# Patient Record
Sex: FEMALE | Race: WHITE | NOT HISPANIC OR LATINO | URBAN - NONMETROPOLITAN AREA
[De-identification: names, ages, dates, MRNs, and addresses within clinical notes are randomized per-mention and may not be internally consistent; named-entity substitution may affect disease eponyms.]

---

## 2020-11-12 ENCOUNTER — OFFICE VISIT (OUTPATIENT)
Dept: URBAN - NONMETROPOLITAN AREA CLINIC 2 | Facility: CLINIC | Age: 80
End: 2020-11-12

## 2020-11-12 ENCOUNTER — OFFICE VISIT (OUTPATIENT)
Dept: URBAN - NONMETROPOLITAN AREA CLINIC 2 | Facility: CLINIC | Age: 80
End: 2020-11-12
Payer: MEDICARE

## 2020-11-12 DIAGNOSIS — R05 COUGH: ICD-10-CM

## 2020-11-12 DIAGNOSIS — K63.5 COLON POLYPS: ICD-10-CM

## 2020-11-12 DIAGNOSIS — K44.9 HIATAL HERNIA: ICD-10-CM

## 2020-11-12 PROCEDURE — 99212 OFFICE O/P EST SF 10 MIN: CPT | Performed by: NURSE PRACTITIONER

## 2020-11-12 PROCEDURE — G8482 FLU IMMUNIZE ORDER/ADMIN: HCPCS | Performed by: NURSE PRACTITIONER

## 2020-11-12 PROCEDURE — G8427 DOCREV CUR MEDS BY ELIG CLIN: HCPCS | Performed by: NURSE PRACTITIONER

## 2020-11-12 PROCEDURE — G8420 CALC BMI NORM PARAMETERS: HCPCS | Performed by: NURSE PRACTITIONER

## 2020-11-12 PROCEDURE — G9903 PT SCRN TBCO ID AS NON USER: HCPCS | Performed by: NURSE PRACTITIONER

## 2020-11-12 RX ORDER — FAMOTIDINE 40 MG/1
TAKE 1 TABLET (40 MG) BY ORAL ROUTE ONCE DAILY AT BEDTIME FOR 90 DAYS TABLET ORAL 1
Qty: 90 | Refills: 3 | Status: ACTIVE | COMMUNITY
Start: 2019-12-06 | End: 2020-11-30

## 2020-11-12 NOTE — HPI-OTHER HISTORIES
EGD over the summer 2018 with a small 2 cm hiatal hernia and esophagitis.  She did have a nonobstructing Schatzki ring as well.  Colonoscopy with one TA polyp.

## 2020-11-12 NOTE — HPI-TODAY'S VISIT:
Ms Gaxiola returns for f/u. She is having no GI complaints. She has had no further coughing issues or heartburn. she remains on pepcid daily. She did have a fall on the way to [place] this summer. She ended up with a bruises on her breast, stitches to her lip, and $6K ED bill. SB

## 2021-01-05 ENCOUNTER — ERX REFILL RESPONSE (OUTPATIENT)
Age: 81
End: 2021-01-05

## 2021-01-05 RX ORDER — FAMOTIDINE 40 MG/1
TAKE 1 TABLET BY MOUTH EVERY DAY AT BEDTIME TABLET, FILM COATED ORAL
Qty: 90 | Refills: 0

## 2021-02-05 ENCOUNTER — ERX REFILL RESPONSE (OUTPATIENT)
Age: 81
End: 2021-02-05

## 2021-02-05 RX ORDER — FAMOTIDINE 40 MG/1
TAKE 1 TABLET BY MOUTH EVERY DAY AT BEDTIME TABLET, FILM COATED ORAL
Qty: 90 | Refills: 0

## 2021-04-14 ENCOUNTER — OFFICE VISIT (OUTPATIENT)
Dept: URBAN - NONMETROPOLITAN AREA CLINIC 2 | Facility: CLINIC | Age: 81
End: 2021-04-14
Payer: MEDICARE

## 2021-04-14 DIAGNOSIS — K44.9 HIATAL HERNIA: ICD-10-CM

## 2021-04-14 DIAGNOSIS — K59.04 CHRONIC IDIOPATHIC CONSTIPATION: ICD-10-CM

## 2021-04-14 DIAGNOSIS — K63.5 COLON POLYPS: ICD-10-CM

## 2021-04-14 DIAGNOSIS — R05 COUGH: ICD-10-CM

## 2021-04-14 DIAGNOSIS — R10.11 RUQ ABDOMINAL PAIN: ICD-10-CM

## 2021-04-14 PROBLEM — 82934008: Status: ACTIVE | Noted: 2021-04-14

## 2021-04-14 PROCEDURE — 99213 OFFICE O/P EST LOW 20 MIN: CPT | Performed by: NURSE PRACTITIONER

## 2021-04-14 RX ORDER — FAMOTIDINE 40 MG/1
TAKE 1 TABLET BY MOUTH EVERY DAY AT BEDTIME TABLET, FILM COATED ORAL
Qty: 90 | Refills: 0 | Status: ACTIVE | COMMUNITY

## 2021-04-14 RX ORDER — DOCUSATE SODIUM 100 MG/1
2 CAPSULES CAPSULE ORAL ONCE A DAY
Qty: 60 CAPSULE | Refills: 6 | OUTPATIENT
Start: 2021-04-14 | End: 2021-11-09

## 2021-04-14 RX ORDER — LORATADINE 10 MG
1 PACKET MIXED WITH 8 OUNCES OF FLUID TABLET,DISINTEGRATING ORAL ONCE A DAY
Qty: 30 | OUTPATIENT
Start: 2021-04-14 | End: 2021-05-14

## 2021-04-14 NOTE — HPI-TODAY'S VISIT:
Ms Gaxiola returns for f/u of GERD. Heartrbun is well controlled on pepcid nightly. her main CC today is constipation. she has started having issues with only moving her bowels 2-3x weekly and used to go daily. occasionally, she will get a ruq pain that is relieved with BM. No additional complaints.  SB

## 2021-05-06 ENCOUNTER — ERX REFILL RESPONSE (OUTPATIENT)
Dept: URBAN - NONMETROPOLITAN AREA CLINIC 2 | Facility: CLINIC | Age: 81
End: 2021-05-06

## 2021-05-06 RX ORDER — PANTOPRAZOLE SODIUM 20 MG/1
TAKE 1 TABLET BY MOUTH EVERY DAY TABLET, DELAYED RELEASE ORAL
Qty: 90 | Refills: 3

## 2021-05-12 ENCOUNTER — ERX REFILL RESPONSE (OUTPATIENT)
Dept: URBAN - NONMETROPOLITAN AREA CLINIC 2 | Facility: CLINIC | Age: 81
End: 2021-05-12

## 2021-05-12 RX ORDER — FAMOTIDINE 40 MG/1
TAKE 1 TABLET BY MOUTH EVERY DAY AT BEDTIME TABLET, FILM COATED ORAL
Qty: 90 | Refills: 0

## 2022-05-03 ENCOUNTER — ERX REFILL RESPONSE (OUTPATIENT)
Dept: URBAN - NONMETROPOLITAN AREA CLINIC 2 | Facility: CLINIC | Age: 82
End: 2022-05-03

## 2022-05-03 RX ORDER — PANTOPRAZOLE SODIUM 20 MG/1
TAKE 1 TABLET BY MOUTH EVERY DAY TABLET, DELAYED RELEASE ORAL
Qty: 90 TABLET | Refills: 4 | OUTPATIENT

## 2022-05-03 RX ORDER — PANTOPRAZOLE SODIUM 20 MG/1
TAKE 1 TABLET BY MOUTH EVERY DAY TABLET, DELAYED RELEASE ORAL
Qty: 90 | Refills: 3 | OUTPATIENT

## 2023-04-26 ENCOUNTER — ERX REFILL RESPONSE (OUTPATIENT)
Dept: URBAN - NONMETROPOLITAN AREA CLINIC 2 | Facility: CLINIC | Age: 83
End: 2023-04-26

## 2023-04-26 RX ORDER — PANTOPRAZOLE SODIUM 20 MG/1
TAKE 1 TABLET BY MOUTH EVERY DAY TABLET, DELAYED RELEASE ORAL
Qty: 90 TABLET | Refills: 4 | OUTPATIENT

## 2024-01-12 ENCOUNTER — OFFICE VISIT (OUTPATIENT)
Dept: URBAN - NONMETROPOLITAN AREA CLINIC 2 | Facility: CLINIC | Age: 84
End: 2024-01-12
Payer: MEDICARE

## 2024-01-12 ENCOUNTER — LAB OUTSIDE AN ENCOUNTER (OUTPATIENT)
Dept: URBAN - NONMETROPOLITAN AREA CLINIC 2 | Facility: CLINIC | Age: 84
End: 2024-01-12

## 2024-01-12 VITALS
TEMPERATURE: 98 F | DIASTOLIC BLOOD PRESSURE: 58 MMHG | SYSTOLIC BLOOD PRESSURE: 99 MMHG | BODY MASS INDEX: 26.41 KG/M2 | WEIGHT: 131 LBS | HEART RATE: 73 BPM | HEIGHT: 59 IN

## 2024-01-12 DIAGNOSIS — K59.09 OTHER CONSTIPATION: ICD-10-CM

## 2024-01-12 DIAGNOSIS — K30 INDIGESTION: ICD-10-CM

## 2024-01-12 DIAGNOSIS — K92.1 MELENA: ICD-10-CM

## 2024-01-12 PROBLEM — 162031009: Status: ACTIVE | Noted: 2024-01-12

## 2024-01-12 PROCEDURE — 99244 OFF/OP CNSLTJ NEW/EST MOD 40: CPT | Performed by: NURSE PRACTITIONER

## 2024-01-12 PROCEDURE — 99214 OFFICE O/P EST MOD 30 MIN: CPT | Performed by: NURSE PRACTITIONER

## 2024-01-12 RX ORDER — PANTOPRAZOLE SODIUM 20 MG/1
TAKE 1 TABLET BY MOUTH EVERY DAY TABLET, DELAYED RELEASE ORAL
Qty: 90 TABLET | Refills: 4 | Status: ACTIVE | COMMUNITY

## 2024-01-12 RX ORDER — OMEPRAZOLE 40 MG/1
1 CAPSULE 30 MINUTES BEFORE MORNING MEAL AND EVENING MEAL CAPSULE, DELAYED RELEASE ORAL TWICE DAILY
Qty: 60 | Refills: 3 | OUTPATIENT
Start: 2024-01-12

## 2024-01-12 RX ORDER — FAMOTIDINE 40 MG/1
TAKE 1 TABLET BY MOUTH EVERY DAY AT BEDTIME TABLET, FILM COATED ORAL
Qty: 90 | Refills: 0 | Status: ACTIVE | COMMUNITY

## 2024-01-12 NOTE — HPI-OTHER HISTORIES
4/14/2021: Ms Gaxiola returns for f/u of GERD. Heartrbun is well controlled on pepcid nightly. her main CC today is constipation. she has started having issues with only moving her bowels 2-3x weekly and used to go daily. occasionally, she will get a ruq pain that is relieved with BM. No additional complaints. SB  EGD over the summer 2018 with a small 2 cm hiatal hernia and esophagitis.  She did have a nonobstructing Schatzki ring as well.  Colonoscopy with one TA polyp.

## 2024-01-12 NOTE — HPI-TODAY'S VISIT:
Ms. Donna Gaxiola is an 83-year-old female who presents today for melena. She was referred to our office for evaluation by Dr. Eng . A copy of this note and recommendations will be sent to the referring provider's office. Donna reports noting black stools for 2 months.  She also noted indigestion but she was not taking her prescribed omeprazole.  She was started on omeprazole 40 mg twice daily by Dr. Eng, but upon further evaluation t it appears that the prescription was sent for 10 mg once a day.  She has been taking this since she saw him on Tuesday.  Continues to endorse melena.  Also notes some difficulty in passing stool.  No overt hematochezia nor hematemesis.  Denies feeling fatigued, dizzy, or lightheaded.  LG.

## 2024-01-13 LAB
ABSOLUTE BASOPHILS: 57
ABSOLUTE EOSINOPHILS: 91
ABSOLUTE LYMPHOCYTES: 1117
ABSOLUTE MONOCYTES: 1037
ABSOLUTE NEUTROPHILS: 9097
BASOPHILS: 0.5
EOSINOPHILS: 0.8
FERRITIN, SERUM: 241
HEMATOCRIT: 29.7
HEMOGLOBIN: 10.2
IRON BIND.CAP.(TIBC): 232
IRON SATURATION: 13
IRON: 30
LYMPHOCYTES: 9.8
MCH: 30.3
MCHC: 34.3
MCV: 88.1
MONOCYTES: 9.1
MPV: 12.1
NEUTROPHILS: 79.8
PLATELET COUNT: 153
RDW: 12.8
RED BLOOD CELL COUNT: 3.37
WHITE BLOOD CELL COUNT: 11.4

## 2024-01-26 ENCOUNTER — LAB OUTSIDE AN ENCOUNTER (OUTPATIENT)
Dept: URBAN - NONMETROPOLITAN AREA CLINIC 2 | Facility: CLINIC | Age: 84
End: 2024-01-26

## 2024-01-26 ENCOUNTER — OFFICE VISIT (OUTPATIENT)
Dept: URBAN - METROPOLITAN AREA MEDICAL CENTER 1 | Facility: MEDICAL CENTER | Age: 84
End: 2024-01-26
Payer: MEDICARE

## 2024-01-26 DIAGNOSIS — K29.60 ADENOPAPILLOMATOSIS GASTRICA: ICD-10-CM

## 2024-01-26 DIAGNOSIS — K92.1 ACUTE MELENA: ICD-10-CM

## 2024-01-26 PROCEDURE — 43239 EGD BIOPSY SINGLE/MULTIPLE: CPT | Performed by: INTERNAL MEDICINE

## 2024-01-29 LAB
AP CASE REPORT: (no result)
AP FINAL DIAGNOSIS: (no result)
AP GROSS DESCRIPTION: (no result)
AP MICROSCOPIC DESCRIPTION: (no result)
AP SPECIAL STAINS: (no result)

## 2024-02-22 ENCOUNTER — OV EP (OUTPATIENT)
Dept: URBAN - NONMETROPOLITAN AREA CLINIC 13 | Facility: CLINIC | Age: 84
End: 2024-02-22
Payer: MEDICARE

## 2024-02-22 VITALS
HEART RATE: 69 BPM | WEIGHT: 137 LBS | DIASTOLIC BLOOD PRESSURE: 71 MMHG | SYSTOLIC BLOOD PRESSURE: 168 MMHG | HEIGHT: 59 IN | BODY MASS INDEX: 27.62 KG/M2

## 2024-02-22 DIAGNOSIS — K59.09 OTHER CONSTIPATION: ICD-10-CM

## 2024-02-22 DIAGNOSIS — K92.1 MELENA: ICD-10-CM

## 2024-02-22 DIAGNOSIS — K30 INDIGESTION: ICD-10-CM

## 2024-02-22 PROCEDURE — 99214 OFFICE O/P EST MOD 30 MIN: CPT | Performed by: INTERNAL MEDICINE

## 2024-02-22 RX ORDER — FAMOTIDINE 40 MG/1
TAKE 1 TABLET BY MOUTH EVERY DAY AT BEDTIME TABLET, FILM COATED ORAL
Qty: 90 | Refills: 0 | Status: ACTIVE | COMMUNITY

## 2024-02-22 RX ORDER — PANTOPRAZOLE SODIUM 20 MG/1
TAKE 1 TABLET BY MOUTH EVERY DAY TABLET, DELAYED RELEASE ORAL
Qty: 90 TABLET | Refills: 4 | Status: ACTIVE | COMMUNITY

## 2024-02-22 RX ORDER — OMEPRAZOLE 40 MG/1
1 CAPSULE 30 MINUTES BEFORE MORNING MEAL AND EVENING MEAL CAPSULE, DELAYED RELEASE ORAL TWICE DAILY
Qty: 60 | Refills: 3 | OUTPATIENT

## 2024-02-22 RX ORDER — OMEPRAZOLE 40 MG/1
1 CAPSULE 30 MINUTES BEFORE MORNING MEAL AND EVENING MEAL CAPSULE, DELAYED RELEASE ORAL TWICE DAILY
Qty: 60 | Refills: 3 | Status: ACTIVE | COMMUNITY
Start: 2024-01-12

## 2024-02-22 NOTE — HPI-OTHER HISTORIES
4/14/2021: Ms Gaxiola returns for f/u of GERD. Heartrbun is well controlled on pepcid nightly. her main CC today is constipation. she has started having issues with only moving her bowels 2-3x weekly and used to go daily. occasionally, she will get a ruq pain that is relieved with BM. No additional complaints. SB  EGD over the summer 2018 with a small 2 cm hiatal hernia and esophagitis.  She did have a nonobstructing Schatzki ring as well.  Colonoscopy with one TA polyp.  1/12/2024 Ms. Donna Gaxiola is an 83-year-old female who presents today for melena. She was referred to our office for evaluation by Dr. Eng . A copy of this note and recommendations will be sent to the referring provider's office. Donna reports noting black stools for 2 months. She also noted indigestion but she was not taking her prescribed omeprazole. She was started on omeprazole 40 mg twice daily by Dr. Eng, but upon further evaluation t it appears that the prescription was sent for 10 mg once a day. She has been taking this since she saw him on Tuesday. Continues to endorse melena. Also notes some difficulty in passing stool. No overt hematochezia nor hematemesis. Denies feeling fatigued, dizzy, or lightheaded. LG.  1/26/2024 EGD: mild gastritis. mild-moderate gastritis

## 2024-02-22 NOTE — PHYSICAL EXAM HENT:
Head,  normocephalic,  atraumatic,  Face,  Face within normal limits,  Ears,  External ears within normal limits,  Nose/Nasopharynx,  External nose  normal appearance,  Lips,  Appearance normal , Head,  normocephalic,  atraumatic,  Face,  Face within normal limits,  Ears,  External ears within normal limits,  Nose/Nasopharynx,  External nose  normal appearance, Lips,  Appearance normal

## 2024-02-22 NOTE — HPI-TODAY'S VISIT:
2/22/2024 Ms. Gaxiola returns for follow-up.  She had reports of melena and anemia.  Hemoglobin and iron were low.  Upper endoscopy was performed that showed gastritis without peptic ulcer disease.  She has had no melena.  She denies any reflux or upper GI symptoms.  She does not have any blood in her stool.  She had a colonoscopy with Dr. Woodson in the past.  She does have a family history of colon cancer.  She denies any weight loss

## 2024-02-22 NOTE — PHYSICAL EXAM CONSTITUTIONAL:
well developed, well nourished , in no acute distress , ambulating without difficulty , normal communication ability , well developed, well nourished , in no acute distress , ambulating without difficulty, normal communication ability

## 2024-02-22 NOTE — PHYSICAL EXAM CHEST:
breathing is unlabored without accessory muscle use, normal breath sounds ,  unlabored without accessory muscle use,normal breath sounds

## 2024-02-22 NOTE — PHYSICAL EXAM GASTROINTESTINAL
Abdomen , soft, nontender, nondistended , no guarding or rigidity , no masses palpable , normal bowel sounds , Liver and Spleen , no hepatosplenomegaly , liver nontender , spleen not palpable , Abdomen , soft, nontender, nondistended , no guarding or rigidity , no masses palpable , normal bowel sounds , Liver and Spleen , no hepatomegaly present  , liver nontender ,